# Patient Record
Sex: FEMALE | URBAN - METROPOLITAN AREA
[De-identification: names, ages, dates, MRNs, and addresses within clinical notes are randomized per-mention and may not be internally consistent; named-entity substitution may affect disease eponyms.]

---

## 2020-06-12 ENCOUNTER — APPOINTMENT (RX ONLY)
Dept: URBAN - METROPOLITAN AREA CLINIC 347 | Facility: CLINIC | Age: 40
Setting detail: DERMATOLOGY
End: 2020-06-12

## 2020-06-12 DIAGNOSIS — L21.8 OTHER SEBORRHEIC DERMATITIS: ICD-10-CM

## 2020-06-12 PROCEDURE — ? PRESCRIPTION

## 2020-06-12 PROCEDURE — ? COUNSELING

## 2020-06-12 PROCEDURE — 99202 OFFICE O/P NEW SF 15 MIN: CPT

## 2020-06-12 PROCEDURE — ? TREATMENT REGIMEN

## 2020-06-12 RX ORDER — KETOCONAZOLE 20 MG/ML
SHAMPOO TOPICAL
Qty: 1 | Refills: 3 | Status: ERX | COMMUNITY
Start: 2020-06-12

## 2020-06-12 RX ADMIN — KETOCONAZOLE: 20 SHAMPOO TOPICAL at 00:00

## 2020-06-12 ASSESSMENT — LOCATION SIMPLE DESCRIPTION DERM
LOCATION SIMPLE: LEFT CHEEK
LOCATION SIMPLE: LEFT FOREHEAD
LOCATION SIMPLE: SUBMENTAL CHIN

## 2020-06-12 ASSESSMENT — LOCATION DETAILED DESCRIPTION DERM
LOCATION DETAILED: SUBMENTAL CHIN
LOCATION DETAILED: LEFT INFERIOR CENTRAL MALAR CHEEK
LOCATION DETAILED: LEFT SUPERIOR FOREHEAD

## 2020-06-12 ASSESSMENT — LOCATION ZONE DERM: LOCATION ZONE: FACE

## 2020-06-12 NOTE — PROCEDURE: TREATMENT REGIMEN
Discontinue Regimen: Try to d/c or decrease use of grease / oil on hair / scalp.\\nOil-based facial cleanser.  Pt states that she uses baby oil to wash her face everyday
Initiate Treatment: Ketoconazole shampoo - wash hair every other day (or as often as she washes her hair)\\nDoxy 50mg- one pill once a day with food
Samples Given: Promiseb - apply to face as needed.\\nLoyon - spray as needed for dryness/scaly patches/ itching.\\nTrianex- use only as needed up to BID for flares on face (use very sparingly).  We did not have any lower potency sample in the office today.
Otc Regimen: Zinc bar - wash face up to twice daily.  Pt to purchase today\\nCicalfate- use up to twice daily (recommended to use PM).  Pt to purchase today.
Detail Level: Zone

## 2020-06-22 ENCOUNTER — APPOINTMENT (RX ONLY)
Dept: URBAN - METROPOLITAN AREA CLINIC 347 | Facility: CLINIC | Age: 40
Setting detail: DERMATOLOGY
End: 2020-06-22

## 2020-06-22 DIAGNOSIS — L71.0 PERIORAL DERMATITIS: ICD-10-CM

## 2020-06-22 PROBLEM — L30.9 DERMATITIS, UNSPECIFIED: Status: ACTIVE | Noted: 2020-06-22

## 2020-06-22 PROCEDURE — 99213 OFFICE O/P EST LOW 20 MIN: CPT

## 2020-06-22 PROCEDURE — ? COUNSELING

## 2020-06-22 PROCEDURE — ? TREATMENT REGIMEN

## 2020-06-22 PROCEDURE — ? PRESCRIPTION

## 2020-06-22 RX ORDER — IVERMECTIN 10 MG/G
CREAM TOPICAL
Qty: 1 | Refills: 1 | Status: ERX | COMMUNITY
Start: 2020-06-22

## 2020-06-22 RX ORDER — DOXYCYCLINE HYCLATE 100 MG/1
TABLET, COATED ORAL
Qty: 60 | Refills: 2 | Status: ERX | COMMUNITY
Start: 2020-06-22

## 2020-06-22 RX ADMIN — IVERMECTIN: 10 CREAM TOPICAL at 00:00

## 2020-06-22 RX ADMIN — DOXYCYCLINE HYCLATE: 100 TABLET, COATED ORAL at 00:00

## 2020-06-22 ASSESSMENT — LOCATION ZONE DERM: LOCATION ZONE: FACE

## 2020-06-22 ASSESSMENT — LOCATION DETAILED DESCRIPTION DERM: LOCATION DETAILED: LEFT INFERIOR CENTRAL MALAR CHEEK

## 2020-06-22 ASSESSMENT — LOCATION SIMPLE DESCRIPTION DERM: LOCATION SIMPLE: LEFT CHEEK

## 2020-06-22 NOTE — PROCEDURE: TREATMENT REGIMEN
Samples Given: Soolantra.\\nVanicream cleansing bar.\\nPromiseb x 2\\nCetaphil moisurizing lotion.\\nCeraVe Hydrating cleanser
Plan: Pt to call us in about a week if she not improved.  We may need to add prednisone PO
Modify Regimen: Doxycycline 100mg one pill, twice daily with food
Continue Regimen: Trianex as needed for itching.\\nPromiseb as needed \\nCiclafate as needed
Initiate Treatment: Soolantra, thin layer to entire face once daily.  Pt states she feels like she has a bug crawling on her face
Detail Level: Zone
Discontinue Regimen: Zinc bar (possible irritant).\\nDoxy 50mg.\\nGrease / oil to scalp as before